# Patient Record
Sex: MALE
[De-identification: names, ages, dates, MRNs, and addresses within clinical notes are randomized per-mention and may not be internally consistent; named-entity substitution may affect disease eponyms.]

---

## 2020-09-24 DIAGNOSIS — R06.02 SHORTNESS OF BREATH: ICD-10-CM

## 2020-09-24 PROBLEM — Z00.00 ENCOUNTER FOR PREVENTIVE HEALTH EXAMINATION: Status: ACTIVE | Noted: 2020-09-24

## 2020-10-14 DIAGNOSIS — Z01.818 ENCOUNTER FOR OTHER PREPROCEDURAL EXAMINATION: ICD-10-CM

## 2020-10-16 ENCOUNTER — APPOINTMENT (OUTPATIENT)
Dept: DISASTER EMERGENCY | Facility: CLINIC | Age: 45
End: 2020-10-16

## 2020-10-17 LAB — SARS-COV-2 N GENE NPH QL NAA+PROBE: NOT DETECTED

## 2020-10-21 ENCOUNTER — APPOINTMENT (OUTPATIENT)
Dept: PULMONOLOGY | Facility: CLINIC | Age: 45
End: 2020-10-21
Payer: COMMERCIAL

## 2020-10-21 VITALS
HEART RATE: 64 BPM | DIASTOLIC BLOOD PRESSURE: 76 MMHG | WEIGHT: 200 LBS | OXYGEN SATURATION: 97 % | TEMPERATURE: 98.4 F | SYSTOLIC BLOOD PRESSURE: 116 MMHG

## 2020-10-21 VITALS — BODY MASS INDEX: 27.89 KG/M2 | HEIGHT: 71 IN

## 2020-10-21 DIAGNOSIS — F95.2 TOURETTE'S DISORDER: ICD-10-CM

## 2020-10-21 DIAGNOSIS — H46.9 UNSPECIFIED OPTIC NEURITIS: ICD-10-CM

## 2020-10-21 PROCEDURE — 99204 OFFICE O/P NEW MOD 45 MIN: CPT | Mod: 25

## 2020-10-21 PROCEDURE — 94727 GAS DIL/WSHOT DETER LNG VOL: CPT

## 2020-10-21 PROCEDURE — ZZZZZ: CPT

## 2020-10-21 PROCEDURE — 94010 BREATHING CAPACITY TEST: CPT

## 2020-10-21 PROCEDURE — 94729 DIFFUSING CAPACITY: CPT

## 2020-10-21 RX ORDER — FLUVOXAMINE MALEATE 50 MG
50 TABLET ORAL
Refills: 0 | Status: ACTIVE | COMMUNITY

## 2020-12-09 ENCOUNTER — APPOINTMENT (OUTPATIENT)
Dept: PULMONOLOGY | Facility: CLINIC | Age: 45
End: 2020-12-09
Payer: COMMERCIAL

## 2020-12-09 ENCOUNTER — APPOINTMENT (OUTPATIENT)
Dept: PULMONOLOGY | Facility: CLINIC | Age: 45
End: 2020-12-09

## 2020-12-09 PROCEDURE — 95800 SLP STDY UNATTENDED: CPT | Mod: 52

## 2020-12-09 NOTE — ASSESSMENT
[FreeTextEntry1] : Results of sleep study reviewed with patient. Treatment options including weight loss oral appliance therapy and PAP therapy were reviewed with patient. After careful review of sleep study patient desire and risk factors recommend initial therapy with PAP. Will order auto titrating device.\par Follow up for data download and compliance assessment.\par The patient does have an oral appliance which she has not used.  His apnea is somewhat positional and there is a chance that the oral appliance will treat him effectively.  I told him to use the oral appliance in the meanwhile until he gets his CPAP machine at which time we will assess the comparative effectiveness of the treatments.

## 2020-12-09 NOTE — ADDENDUM
[FreeTextEntry1] : 12/9/20\par \par Pre- sleep study documentation not available\par Will obtain new sleep study\par Based on history and physical the patient has a high likelihood of having obstructive sleep apnea. Further assessment by sleep testing is recommended. There is no contraindication to a home sleep study. We will therefore proceed to two night home apnea sleep study for further assessment.\par

## 2020-12-09 NOTE — HISTORY OF PRESENT ILLNESS
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

## 2020-12-10 PROCEDURE — 95800 SLP STDY UNATTENDED: CPT

## 2021-01-06 ENCOUNTER — APPOINTMENT (OUTPATIENT)
Dept: PULMONOLOGY | Facility: CLINIC | Age: 46
End: 2021-01-06

## 2021-05-12 ENCOUNTER — APPOINTMENT (OUTPATIENT)
Dept: PULMONOLOGY | Facility: CLINIC | Age: 46
End: 2021-05-12
Payer: COMMERCIAL

## 2021-05-12 VITALS
DIASTOLIC BLOOD PRESSURE: 67 MMHG | WEIGHT: 185 LBS | OXYGEN SATURATION: 96 % | SYSTOLIC BLOOD PRESSURE: 113 MMHG | HEIGHT: 71 IN | BODY MASS INDEX: 25.9 KG/M2 | HEART RATE: 62 BPM

## 2021-05-12 PROCEDURE — 99072 ADDL SUPL MATRL&STAF TM PHE: CPT

## 2021-05-12 PROCEDURE — 99213 OFFICE O/P EST LOW 20 MIN: CPT

## 2021-05-12 NOTE — HISTORY OF PRESENT ILLNESS
[TextBox_4] : Followup for sleep apnea\par Patient here to review results of home sleep study.\par No change in history as reported on initial evaluation, however has lost 25 pounds since sleep study\par still has snoring issue does not report daytime sleepiness

## 2021-05-12 NOTE — ASSESSMENT
[FreeTextEntry1] : Severe PARDEEP documented on home sleep study but patient has lost 25 pounds since study.  Recommend repeat home sleep study for assessment for optimal treatment.  If there is been a significant reduction in sleep apnea severity would evaluate for oral appliance therapy otherwise recommend starting CPAP.\par \par \par I did have a discussion with the patient about getting this work-up done a little bit more expeditiously.  He did not follow-up promptly after his last sleep study I told him I expect that we will repeat the sleep study and do follow-up within 30 days

## 2021-05-21 ENCOUNTER — APPOINTMENT (OUTPATIENT)
Dept: PULMONOLOGY | Facility: CLINIC | Age: 46
End: 2021-05-21
Payer: COMMERCIAL

## 2021-05-21 PROCEDURE — 95800 SLP STDY UNATTENDED: CPT

## 2021-05-22 PROCEDURE — 95800 SLP STDY UNATTENDED: CPT

## 2021-06-23 ENCOUNTER — APPOINTMENT (OUTPATIENT)
Dept: PULMONOLOGY | Facility: CLINIC | Age: 46
End: 2021-06-23
Payer: COMMERCIAL

## 2021-06-23 PROCEDURE — 99213 OFFICE O/P EST LOW 20 MIN: CPT

## 2021-06-23 PROCEDURE — 99072 ADDL SUPL MATRL&STAF TM PHE: CPT

## 2021-06-23 NOTE — PROCEDURE
[FreeTextEntry1] : Home sleep study demonstrates mild sleep apnea with AHI of 14 events per hour.  Nonsupine AHI in normal range.  In comparison to prior sleep studies there has been a significant interval improvement in AHI from 35 events per hour to 14 events per hour.

## 2021-06-23 NOTE — ASSESSMENT
[FreeTextEntry1] : Treatment options for mild positional sleep apnea discussed.  Difficult to assess exactly how symptomatic patient is at this point.  For now we will start with positional therapy suggested purchasing Zzoma pillow.  Other treatment options would include oral appliance therapy or CPAP.  Clinical follow-up in 3 months

## 2021-10-06 ENCOUNTER — APPOINTMENT (OUTPATIENT)
Dept: PULMONOLOGY | Facility: CLINIC | Age: 46
End: 2021-10-06

## 2022-06-15 ENCOUNTER — APPOINTMENT (OUTPATIENT)
Dept: PULMONOLOGY | Facility: CLINIC | Age: 47
End: 2022-06-15
Payer: COMMERCIAL

## 2022-06-15 VITALS
HEART RATE: 90 BPM | DIASTOLIC BLOOD PRESSURE: 71 MMHG | HEIGHT: 71 IN | WEIGHT: 196 LBS | OXYGEN SATURATION: 93 % | BODY MASS INDEX: 27.44 KG/M2 | SYSTOLIC BLOOD PRESSURE: 110 MMHG

## 2022-06-15 DIAGNOSIS — G47.33 OBSTRUCTIVE SLEEP APNEA (ADULT) (PEDIATRIC): ICD-10-CM

## 2022-06-15 PROCEDURE — 99213 OFFICE O/P EST LOW 20 MIN: CPT

## 2022-06-15 NOTE — ASSESSMENT
[FreeTextEntry1] : Patient wishes to try again with positional therapy and I gave him a prescription for a Zomma pillow.  He also discussed other treatment options which would include an oral appliance or using the excite PARDEEP tongue stimulator device.  Follow-up in 3 months

## 2022-06-15 NOTE — HISTORY OF PRESENT ILLNESS
[TextBox_4] : Follow-up for sleep apnea.  At last visit recommended positional therapy.  Patient did not follow through on treatment did not order positional therapy device and is here for follow-up discussion he continues to have snoring issues.  He continues to report nonrestorative sleep continues to do exercise.

## 2022-09-21 ENCOUNTER — APPOINTMENT (OUTPATIENT)
Dept: PULMONOLOGY | Facility: CLINIC | Age: 47
End: 2022-09-21